# Patient Record
Sex: MALE | Race: WHITE | Employment: PART TIME | URBAN - METROPOLITAN AREA
[De-identification: names, ages, dates, MRNs, and addresses within clinical notes are randomized per-mention and may not be internally consistent; named-entity substitution may affect disease eponyms.]

---

## 2022-04-25 ENCOUNTER — HOSPITAL ENCOUNTER (OUTPATIENT)
Dept: GENERAL RADIOLOGY | Age: 72
Discharge: HOME | End: 2022-04-27
Payer: MEDICARE

## 2022-04-25 ENCOUNTER — HOSPITAL ENCOUNTER (OUTPATIENT)
Age: 72
Discharge: HOME | End: 2022-04-27
Payer: MEDICARE

## 2022-04-25 DIAGNOSIS — J45.20 MILD INTERMITTENT ASTHMA WITHOUT COMPLICATION: ICD-10-CM

## 2022-04-25 PROCEDURE — 71046 X-RAY EXAM CHEST 2 VIEWS: CPT

## 2024-06-03 ENCOUNTER — OFFICE VISIT (OUTPATIENT)
Dept: ORTHOPEDIC SURGERY | Age: 74
End: 2024-06-03
Payer: COMMERCIAL

## 2024-06-03 DIAGNOSIS — S42.294A OTHER CLOSED NONDISPLACED FRACTURE OF PROXIMAL END OF RIGHT HUMERUS, INITIAL ENCOUNTER: Primary | ICD-10-CM

## 2024-06-03 PROCEDURE — 23600 CLTX PROX HUMRL FX W/O MNPJ: CPT | Performed by: ORTHOPAEDIC SURGERY

## 2024-06-03 PROCEDURE — 99203 OFFICE O/P NEW LOW 30 MIN: CPT | Performed by: ORTHOPAEDIC SURGERY

## 2024-06-03 PROCEDURE — 1123F ACP DISCUSS/DSCN MKR DOCD: CPT | Performed by: ORTHOPAEDIC SURGERY

## 2024-06-03 RX ORDER — OXYCODONE HYDROCHLORIDE AND ACETAMINOPHEN 5; 325 MG/1; MG/1
TABLET ORAL
COMMUNITY
Start: 2024-05-31

## 2024-06-03 RX ORDER — ROSUVASTATIN CALCIUM 5 MG/1
TABLET, COATED ORAL
COMMUNITY

## 2024-06-03 RX ORDER — TAMSULOSIN HYDROCHLORIDE 0.4 MG/1
CAPSULE ORAL
COMMUNITY

## 2024-06-03 RX ORDER — OXYCODONE HYDROCHLORIDE AND ACETAMINOPHEN 5; 325 MG/1; MG/1
1 TABLET ORAL EVERY 6 HOURS PRN
Qty: 28 TABLET | Refills: 0 | Status: SHIPPED | OUTPATIENT
Start: 2024-06-03 | End: 2024-06-10

## 2024-06-03 NOTE — PROGRESS NOTES
Jossue Simmons is a 74 y.o. year old male who presents today for evaluation of a right shoulder injury which occurred on 5/31/24.  The patient reports that this injury occurred when he fell onto the shoulder at work.  The patient denies any other injuries.  Movement makes the pain worse, the splint and resting makes the pain better.      The patient's past medical history, medications, and review of systems was reviewed.       On Physical Exam, Jossue Simmons is well-developed, well-nourished, and oriented to person, place and time.  his gait is intact.  On evaluation of his right upper extremity, there is obvious deformity.  There is swelling and is ecchymosis.  he is tender to palpation over the proximal humerus, and otherwise nontender over the remainder of the extremity.  Range of motion is decreased secondary to pain over the right shoulder.  The skin overlying the right arm is intact without evidence of lesion, laceration or abrasion.  Distal pulses are 2+ and symmetric bilaterally.  Sensation is grossly intact to light touch and symmetric bilaterally.    Xrays:  Xrays dated 5/31/24 were reviewed.  Non-displaced greater tuberosity and     Impression:  right proximal humerus Fracture    Plan:   Sling for 6 weeks  Can come out of sling to move elbow, wrist, and hand  Follow up 1 week  Percocet 5mg

## 2024-06-05 DIAGNOSIS — S42.294A OTHER CLOSED NONDISPLACED FRACTURE OF PROXIMAL END OF RIGHT HUMERUS, INITIAL ENCOUNTER: Primary | ICD-10-CM

## 2024-06-11 ENCOUNTER — OFFICE VISIT (OUTPATIENT)
Dept: ORTHOPEDIC SURGERY | Age: 74
End: 2024-06-11

## 2024-06-11 DIAGNOSIS — S42.294A OTHER CLOSED NONDISPLACED FRACTURE OF PROXIMAL END OF RIGHT HUMERUS, INITIAL ENCOUNTER: Primary | ICD-10-CM

## 2024-06-11 PROCEDURE — 99024 POSTOP FOLLOW-UP VISIT: CPT | Performed by: ORTHOPAEDIC SURGERY

## 2024-06-11 NOTE — PROGRESS NOTES
Jossue Simmons returns today for follow-up of a right proximal humerus fracture.  Date of injury was 5/31.    Pain level is a 5/10. He is not taking anything for pain and has been in sling    Physical Exam:  Right shoulder - Skin intact with swelling          is  tender to palpation at the area of the fracture site.          ROM   limited          The skin overlying the  is intact without evidence of scar, lesion, laceration or               abrasion.          Pulses are intact and symmetric bilaterally         Strength 4/5         Sensation wnl    Xrays:   Well aligned proximal humerus fx  Radiographic findings reviewed with patient    Impression:   Encounter Diagnosis   Name Primary?    Other closed nondisplaced fracture of proximal end of right humerus, initial encounter Yes       Plan:   Continue sling  Follow up 4 weeks with xray

## 2024-07-10 ENCOUNTER — OFFICE VISIT (OUTPATIENT)
Dept: ORTHOPEDIC SURGERY | Age: 74
End: 2024-07-10

## 2024-07-10 VITALS — WEIGHT: 205 LBS | TEMPERATURE: 98 F | BODY MASS INDEX: 30.36 KG/M2 | HEIGHT: 69 IN

## 2024-07-10 DIAGNOSIS — S42.294A OTHER CLOSED NONDISPLACED FRACTURE OF PROXIMAL END OF RIGHT HUMERUS, INITIAL ENCOUNTER: Primary | ICD-10-CM

## 2024-07-10 NOTE — PROGRESS NOTES
Jossue Simmons returns today for follow-up of a right proximal humerus fracture.  Date of injury was 5/31. He has been wearing sling. He stated he is better than when I saw him last. He is not taking anything for pain.    Physical Exam:  Right shoulder - Skin intact with swelling          is  tender to palpation at the area of the fracture site.          ROM   limited          The skin overlying the  is intact without evidence of scar, lesion, laceration or               abrasion.          Pulses are intact and symmetric bilaterally         Strength 4/5         Sensation wnl    Xrays:   Well aligned healing proximal humerus fx  Radiographic findings reviewed with patient    Impression:   Encounter Diagnosis   Name Primary?    Other closed nondisplaced fracture of proximal end of right humerus, initial encounter Yes       Plan:  Can d/c sling  C9 for PT referral  Follow up 6 weeks  No heavy lifting  Can begin pendulums and wall walks

## 2024-08-14 ENCOUNTER — EVALUATION (OUTPATIENT)
Dept: PHYSICAL THERAPY | Age: 74
End: 2024-08-14

## 2024-08-14 DIAGNOSIS — S42.294A OTHER NONDISPLACED FRACTURE OF UPPER END OF RIGHT HUMERUS, INITIAL ENCOUNTER FOR CLOSED FRACTURE: Primary | ICD-10-CM

## 2024-08-14 NOTE — PROGRESS NOTES
Physical Therapy Daily Treatment Note    Date: 2024  Patient Name: Jossue Simmons  : 1950   MRN: [id number]  DOInjury: 2024  DOSx: -   Referring Provider: Moises Aquino DO     Medical Diagnosis:   S42.294A (ICD-10-CM) - Other nondisplaced fracture of upper end of right humerus, initial encounter for closed fracture    Lester is a  that fell at work sustaining R proximal humeral fx. He has good starting PT ROM. We will work on ROM and strength. I expect he will do well.     X = TO BE PERFORMED NEXT VISIT  > = PROGRESS TO THIS    S: See eval  O: Discussed anatomy, physiology, body mechanics, principles of loading, and progressive loading/activity.  Reviewed home exercise program extensively; written copy provided.   Access Code: [email or web address]  URL: [email or web address]  Date: 2024  Prepared by: Dilan Collazo    Exercises  - Supine Shoulder Flexion with Dowel  - 3 x daily - 7 x weekly - 2 sets - 10-15 reps  - Supine Shoulder External Rotation with Dowel  - 3 x daily - 7 x weekly - 2 sets - 10-15 reps  - Standing Bilateral Shoulder Internal Rotation AAROM with Dowel  - 3 x daily - 7 x weekly - 2 sets - 10-15 reps  Time 6774-1621     Visit 1 Repeat outcome measure at mid point and end.    Pain Pain with activity 10/10     ROM Right Shoulder:  AROM: 125° Forward elevation,  60° ER in scapular plane,  IR to R PSIS  PROM: 125° Forward elevation,  70° ER in scapular plane,  15° IR     Left Shoulder:  AROM: 155° Forward elevation,  90° ER in scapular plane,  IR to T8  PROM: 170° Forward elevation,  110° ER in scapular plane , 45° IR     Modalities         MO   Manual         MT         Stretch      Table slides   TE   Wall Flexion   TE   Wall ER stretch   TE   Towel IR stretch   TE   IR reaching behind back   TE   Exercise      Shoulder scaption isometrics      Shoulder ER isometrics       Shrugs AROM   TE   Pendulum Ex   TE   Pulleys - flex   TE   Pulleys-IR   TE   Supine wand chest

## 2024-08-14 NOTE — PROGRESS NOTES
[facility] OUTPATIENT REHABILITATION  PHYSICAL THERAPY INITIAL EVALUATION         Date:  2024   Patient: Jossue Simmons  : 1950  MRN: [id number]  Referring Provider:  Moises Aquino DO    Medical Diagnosis:     S42.294A (ICD-10-CM) - Other nondisplaced fracture of upper end of right humerus, initial encounter for closed fracture    Physician Order: Eval and Treat      SUBJECTIVE:     Onset date: 2024    Onset: Sudden     History / Mechanism of Injury: fell on R shoulder at work.  Patient is right handed.    Interventions for current problem:  sling 6 weeks, oxycodone x 5 days    Chief complaint: pain, decreased motion, inability / limited ability to use arm    Behavior: condition is getting better    Pain:   Current: 0/10     Best: 0/10     Worst:10/10 (occurs with reaching).  Pain returns to baseline in a few minutes  Pain frequency: intermittent    Symptom Type / Quality: aching, sharp  Location:: anterior, inferior to acromion, mid-deltoid region        Aggravated by: reaching overhead, reaching out    Relieved by: rest    Imaging results: No results found.    Past Medical History:  No past medical history on file.  No past surgical history on file.    Medications:   Current Outpatient Medications   Medication Sig Dispense Refill    oxyCODONE-acetaminophen (PERCOCET) 5-325 MG per tablet       apixaban (ELIQUIS) 5 MG TABS tablet Eliquis 5 mg tablet   Take 1 tablet twice a day by oral route.      rosuvastatin (CRESTOR) 5 MG tablet rosuvastatin 5 mg tablet   Take 1 tablet every day by oral route.      tamsulosin (FLOMAX) 0.4 MG capsule tamsulosin 0.4 mg capsule   Take 2 capsules every day by oral route.       No current facility-administered medications for this visit.       Occupation:   . Physical demands include: walking, sitting.  Status:  30-35hrs/week .    Exercise regimen: none    Hobbies: working around the house    Patient Goals: pain relief, full use

## 2024-08-16 ENCOUNTER — TREATMENT (OUTPATIENT)
Dept: PHYSICAL THERAPY | Age: 74
End: 2024-08-16

## 2024-08-16 DIAGNOSIS — S42.294A OTHER NONDISPLACED FRACTURE OF UPPER END OF RIGHT HUMERUS, INITIAL ENCOUNTER FOR CLOSED FRACTURE: Primary | ICD-10-CM

## 2024-08-16 NOTE — PROGRESS NOTES
Pulleys-IR   TE   Supine wand chest press 2 x 10  TE   Supine wand flex 2 x 10  TE   Supine wand ER/IR 2 x 10  TE   Standing wand behind back IR / swinging motion 2 x 10  TE   Supine flexion   TE   S-lying ER   TE   Standing wand flex   TE   Standing flexion   TE   ROWS: H   TA   ROWS: M   TA   ROWS: L   TA   ER (towel roll under arm)   TE   IR (towel roll under arm)   TE               A:  Tolerated well.  Pt able to tolerate all ex's with limited range of motion .  P: Continue with rehab plan  Ayush Camacho PTA    Treatment Charges: Mins Units   Initial Evaluation     Re-Evaluation     Ther Exercise         TE 30 2   Manual Therapy     MT     Ther Activities        TA     Gait Training          GT     Neuro Re-education NR     Modalities     Non-Billable Service Time     Other     Total Time/Units 30 2

## 2024-08-19 ENCOUNTER — TREATMENT (OUTPATIENT)
Dept: PHYSICAL THERAPY | Age: 74
End: 2024-08-19
Payer: COMMERCIAL

## 2024-08-19 DIAGNOSIS — S42.294A OTHER CLOSED NONDISPLACED FRACTURE OF PROXIMAL END OF RIGHT HUMERUS, INITIAL ENCOUNTER: Primary | ICD-10-CM

## 2024-08-19 DIAGNOSIS — S42.294A OTHER NONDISPLACED FRACTURE OF UPPER END OF RIGHT HUMERUS, INITIAL ENCOUNTER FOR CLOSED FRACTURE: Primary | ICD-10-CM

## 2024-08-19 PROCEDURE — 97110 THERAPEUTIC EXERCISES: CPT

## 2024-08-19 NOTE — PROGRESS NOTES
Physical Therapy Daily Treatment Note    Date: 2024  Patient Name: Jossue Simmons  : 1950   MRN: [id number]  DOInjury: 2024  DOSx: -   Referring Provider: Moises Aquino DO     Medical Diagnosis:   S42.294A (ICD-10-CM) - Other nondisplaced fracture of upper end of right humerus, initial encounter for closed fracture    Lester is a  that fell at work sustaining R proximal humeral fx. He has good starting PT ROM. We will work on ROM and strength. I expect he will do well.     X = TO BE PERFORMED NEXT VISIT  > = PROGRESS TO THIS  Access Code: [email or web address]  URL: [email or web address]  Date: 2024  Prepared by: Dilan Collazo    Exercises  - Supine Shoulder Flexion with Dowel  - 3 x daily - 7 x weekly - 2 sets - 10-15 reps  - Supine Shoulder External Rotation with Dowel  - 3 x daily - 7 x weekly - 2 sets - 10-15 reps  - Standing Bilateral Shoulder Internal Rotation AAROM with Dowel  - 3 x daily - 7 x weekly - 2 sets - 10-15 reps    S: Pt reports tightness and pain with movement.   O: Discussed anatomy, physiology, body mechanics, principles of loading, and progressive loading/activity.  Reviewed home exercise program extensively; written copy provided.   Time 8306-4398     Visit 3/18  Claim # [id number]  Dx: S42.294A  DOI: 2024   approved approved  through 2024  Repeat outcome measure at mid point and end.    Pain Pain with activity 10/10     ROM Right Shoulder:  AROM: 125° Forward elevation,  60° ER in scapular plane,  IR to R PSIS  PROM: 125° Forward elevation,  70° ER in scapular plane,  15° IR     Left Shoulder:  AROM: 155° Forward elevation,  90° ER in scapular plane,  IR to T8  PROM: 170° Forward elevation,  110° ER in scapular plane , 45° IR     Modalities         MO   Manual         MT         Stretch      Table slides   TE   Wall Flexion   TE   Wall ER stretch   TE   Towel IR stretch   TE   IR reaching behind back   TE   Exercise      Shoulder scaption

## 2024-08-21 ENCOUNTER — TREATMENT (OUTPATIENT)
Dept: PHYSICAL THERAPY | Age: 74
End: 2024-08-21
Payer: COMMERCIAL

## 2024-08-21 DIAGNOSIS — S42.294A OTHER NONDISPLACED FRACTURE OF UPPER END OF RIGHT HUMERUS, INITIAL ENCOUNTER FOR CLOSED FRACTURE: Primary | ICD-10-CM

## 2024-08-21 PROCEDURE — 97110 THERAPEUTIC EXERCISES: CPT

## 2024-08-21 NOTE — PROGRESS NOTES
Physical Therapy Daily Treatment Note    Date: 2024  Patient Name: Jossue Simmons  : 1950   MRN: [id number]  DOInjury: 2024  DOSx: -   Referring Provider: oMises Aquino DO     Medical Diagnosis:   S42.294A (ICD-10-CM) - Other nondisplaced fracture of upper end of right humerus, initial encounter for closed fracture    Lester is a  that fell at work sustaining R proximal humeral fx. He has good starting PT ROM. We will work on ROM and strength. I expect he will do well.     X = TO BE PERFORMED NEXT VISIT  > = PROGRESS TO THIS  Access Code: [email or web address]  URL: [email or web address]  Date: 2024  Prepared by: Dilan Collazo    Exercises  - Supine Shoulder Flexion with Dowel  - 3 x daily - 7 x weekly - 2 sets - 10-15 reps  - Supine Shoulder External Rotation with Dowel  - 3 x daily - 7 x weekly - 2 sets - 10-15 reps  - Standing Bilateral Shoulder Internal Rotation AAROM with Dowel  - 3 x daily - 7 x weekly - 2 sets - 10-15 reps    S: Pt reports tightness and pain with movement.   O: Discussed anatomy, physiology, body mechanics, principles of loading, and progressive loading/activity.  Reviewed home exercise program extensively; written copy provided.   Time 4410-9234     Visit   Claim # [id number]  Dx: S42.294A  DOI: 2024   approved approved  through 2024  Repeat outcome measure at mid point and end.    Pain Pain with activity 10/10     ROM Right Shoulder:  AROM: 125° Forward elevation,  60° ER in scapular plane,  IR to R PSIS  PROM: 125° Forward elevation,  70° ER in scapular plane,  15° IR     Left Shoulder:  AROM: 155° Forward elevation,  90° ER in scapular plane,  IR to T8  PROM: 170° Forward elevation,  110° ER in scapular plane , 45° IR     Modalities         MO   Manual         MT         Stretch      Table slides   TE   Wall Flexion   TE   Wall ER stretch   TE   Towel IR stretch   TE   IR reaching behind back   TE   Exercise      Shoulder scaption

## 2024-08-22 ENCOUNTER — OFFICE VISIT (OUTPATIENT)
Dept: ORTHOPEDIC SURGERY | Age: 74
End: 2024-08-22

## 2024-08-22 VITALS — WEIGHT: 205 LBS | TEMPERATURE: 98 F | BODY MASS INDEX: 30.36 KG/M2 | HEIGHT: 69 IN

## 2024-08-22 DIAGNOSIS — S42.294A OTHER CLOSED NONDISPLACED FRACTURE OF PROXIMAL END OF RIGHT HUMERUS, INITIAL ENCOUNTER: Primary | ICD-10-CM

## 2024-08-22 PROCEDURE — 99024 POSTOP FOLLOW-UP VISIT: CPT | Performed by: ORTHOPAEDIC SURGERY

## 2024-08-22 NOTE — PROGRESS NOTES
Jossue Simmons returns today for follow-up of a right proximal humerus fracture.  Date of injury was 5/31.  He stated he is better than when I saw him last. He is currently in PT. He c/o of pain during certain movements. He is using ice for pain relief. He does get stiffness in the morning.    Physical Exam:  Right shoulder - Skin intact with swelling          is  tender to palpation at the area of the fracture site.          /30/BL           The skin overlying the  is intact without evidence of scar, lesion, laceration or               abrasion.          Pulses are intact and symmetric bilaterally         Strength 4/5         Sensation wnl    Xrays:   Well aligned healing proximal humerus fx  Radiographic findings reviewed with patient    Impression:   Encounter Diagnosis   Name Primary?    Other closed nondisplaced fracture of proximal end of right humerus, initial encounter Yes       Plan:     The patient is weak in supraspinatus 4-/5.  I am concerned he may have torn his rotator cuff as a result of the fall.  I will put in a C9 for an MRI and see him back with the results.  He will remain off work and lacks the ROM and strength to return to work at this time.  Follow up after the MRI is performed.

## 2024-08-23 ENCOUNTER — TREATMENT (OUTPATIENT)
Dept: PHYSICAL THERAPY | Age: 74
End: 2024-08-23

## 2024-08-23 DIAGNOSIS — S42.294A OTHER NONDISPLACED FRACTURE OF UPPER END OF RIGHT HUMERUS, INITIAL ENCOUNTER FOR CLOSED FRACTURE: Primary | ICD-10-CM

## 2024-08-23 NOTE — PROGRESS NOTES
slides   TE   Wall Flexion   TE   Wall ER stretch   TE   Towel IR stretch   TE   IR reaching behind back   TE   Exercise      Shoulder scaption isometrics      Shoulder ER isometrics       Shrugs AROM   TE   Pendulum Ex   TE   Pulleys - flex  X 3 min   TE   Pulleys-IR X 2 min Painful following; d/c next session? TE   Supine wand chest press 2 x 15  TE   Supine wand flex 2 x 15  TE   Supine wand ER/IR 2 x 15  TE   Standing wand behind back IR / swinging motion 2 x 15  TE   Supine flexion 3 x 10  TE   S-lying ER   TE   Standing wand flex   TE   Standing flexion   TE   ROWS: H   TA   ROWS: M Green 2 x 15  TA   ROWS: L   TA   ER (towel roll under arm)   TE   IR (towel roll under arm)   TE               A:  Tolerated well.  Pt able to tolerate all ex's with limited range of motion . Added mid row to HEP; given green tubing for home  P: Continue with rehab plan  Hollie Vines PTA    Treatment Charges: Mins Units   Initial Evaluation     Re-Evaluation     Ther Exercise         TE 30 2   Manual Therapy     MT     Ther Activities        TA     Gait Training          GT     Neuro Re-education NR     Modalities     Non-Billable Service Time     Other     Total Time/Units 30 2

## 2024-08-26 ENCOUNTER — TREATMENT (OUTPATIENT)
Dept: PHYSICAL THERAPY | Age: 74
End: 2024-08-26
Payer: COMMERCIAL

## 2024-08-26 DIAGNOSIS — S42.294A OTHER NONDISPLACED FRACTURE OF UPPER END OF RIGHT HUMERUS, INITIAL ENCOUNTER FOR CLOSED FRACTURE: Primary | ICD-10-CM

## 2024-08-26 PROCEDURE — 97110 THERAPEUTIC EXERCISES: CPT

## 2024-08-26 NOTE — PROGRESS NOTES
Physical Therapy Daily Treatment Note    Date: 2024  Patient Name: Jossue Simmons  : 1950   MRN: [id number]  DOInjury: 2024  DOSx: -   Referring Provider: Moises Aquino DO     Medical Diagnosis:   S42.294A (ICD-10-CM) - Other nondisplaced fracture of upper end of right humerus, initial encounter for closed fracture    Lester is a  that fell at work sustaining R proximal humeral fx. He has good starting PT ROM. We will work on ROM and strength. I expect he will do well.     X = TO BE PERFORMED NEXT VISIT  > = PROGRESS TO THIS  Access Code: [email or web address]  URL: [email or web address]  Date: 2024  Prepared by: Dilan Collazo    Exercises  - Supine Shoulder Flexion with Dowel  - 3 x daily - 7 x weekly - 2 sets - 10-15 reps  - Supine Shoulder External Rotation with Dowel  - 3 x daily - 7 x weekly - 2 sets - 10-15 reps  - Standing Bilateral Shoulder Internal Rotation AAROM with Dowel  - 3 x daily - 7 x weekly - 2 sets - 10-15 reps    S: Pt reports 3/10 pain at rest that elevates to 8/10 during activity. Reports pain does not last long and despite pain shoulder does feel more mobile following activity  O: Discussed anatomy, physiology, body mechanics, principles of loading, and progressive loading/activity.  Reviewed home exercise program extensively; written copy provided.   Time 3979-6710     Visit   Claim # [id number]  Dx: S42.294A  DOI: 2024   approved approved  through 2024  Repeat outcome measure at mid point and end.    Pain Pain at rest 3/10  Pain with activity 8/10     ROM (24)  Right Shoulder:  AROM: 128° Forward elevation,  60° ER in scapular plane,  IR to R PSIS  PROM: 132° Forward elevation,  70° ER in scapular plane,  15° IR     Left Shoulder:  AROM: 155° Forward elevation,  90° ER in scapular plane,  IR to T8  PROM: 170° Forward elevation,  110° ER in scapular plane , 45° IR     Modalities         MO   Manual         MT         Stretch      Table

## 2024-08-28 ENCOUNTER — TRANSCRIBE ORDERS (OUTPATIENT)
Dept: ADMINISTRATIVE | Age: 74
End: 2024-08-28

## 2024-08-28 ENCOUNTER — TREATMENT (OUTPATIENT)
Dept: PHYSICAL THERAPY | Age: 74
End: 2024-08-28

## 2024-08-28 DIAGNOSIS — S42.294A OTHER NONDISPLACED FRACTURE OF UPPER END OF RIGHT HUMERUS, INITIAL ENCOUNTER FOR CLOSED FRACTURE: Primary | ICD-10-CM

## 2024-08-28 DIAGNOSIS — S42.294A OTH NONDISP FX OF UPPER END OF RIGHT HUMERUS, INIT: Primary | ICD-10-CM

## 2024-08-30 ENCOUNTER — TREATMENT (OUTPATIENT)
Dept: PHYSICAL THERAPY | Age: 74
End: 2024-08-30

## 2024-08-30 DIAGNOSIS — S42.294A OTHER NONDISPLACED FRACTURE OF UPPER END OF RIGHT HUMERUS, INITIAL ENCOUNTER FOR CLOSED FRACTURE: Primary | ICD-10-CM

## 2024-08-30 NOTE — PROGRESS NOTES
Physical Therapy Daily Treatment Note    Date: 2024  Patient Name: Jossue Simmons  : 1950   MRN: [id number]  DOInjury: 2024  DOSx: -   Referring Provider: Moises Aquino DO     Medical Diagnosis:   S42.294A (ICD-10-CM) - Other nondisplaced fracture of upper end of right humerus, initial encounter for closed fracture    Lester is a  that fell at work sustaining R proximal humeral fx. He has good starting PT ROM. We will work on ROM and strength. I expect he will do well.     X = TO BE PERFORMED NEXT VISIT  > = PROGRESS TO THIS  Access Code: [email or web address]  URL: [email or web address]  Date: 2024  Prepared by: Dilan Collazo    Exercises  - Supine Shoulder Flexion with Dowel  - 3 x daily - 7 x weekly - 2 sets - 10-15 reps  - Supine Shoulder External Rotation with Dowel  - 3 x daily - 7 x weekly - 2 sets - 10-15 reps  - Standing Bilateral Shoulder Internal Rotation AAROM with Dowel  - 3 x daily - 7 x weekly - 2 sets - 10-15 reps    S: Pt reports not getting any better .  O: Discussed anatomy, physiology, body mechanics, principles of loading, and progressive loading/activity.  Reviewed home exercise program extensively; written copy provided.   Time 7198-6366     Visit 8 visits   Claim # [id number]  Dx: S42.294A  DOI: 2024  Wc approved approved 18 through 2024  Repeat outcome measure at mid point and end.    Pain Pain at rest 3/10  Pain with activity 8/10     ROM (24)  Right Shoulder:  AROM: 128° Forward elevation,  60° ER in scapular plane,  IR to R PSIS  PROM: 132° Forward elevation,  70° ER in scapular plane,  15° IR     Left Shoulder:  AROM: 155° Forward elevation,  90° ER in scapular plane,  IR to T8  PROM: 170° Forward elevation,  110° ER in scapular plane , 45° IR     Modalities         MO   Manual         MT         Stretch      Table slides   TE   Wall Flexion   TE   Wall ER stretch   TE   Towel IR stretch   TE   IR reaching behind back   TE   Exercise       Shoulder scaption isometrics      Shoulder ER isometrics       Shrugs AROM   TE   Pendulum Ex   TE   Pulleys - flex  X 3 min   TE   Pulleys-IR Painful following; d/c next session? TE   Supine wand chest press 2 x 15  TE   Supine wand flex 2 x 15  TE   Supine wand ER/IR 2 x 15  TE   Standing wand behind back IR / swinging motion 2 x 15  TE   Supine flexion  TE   S-lying ER  TE   Standing wand flex   TE   Standing flexion   TE   ROWS: H   TA   ROWS: M Green 2 x 15  TE   ROWS: L Green 2 x 15   TE   ER (towel roll under arm) Red  2 x 15  TE   IR (towel roll under arm) Green 2 x 15  TE               A:  Tolerated well but continues to  c/ lack of motion ,  weakness along with soreness/pain . (  Pt wants to RTW but the issue is he might have a RTC tear.   Wants a MRI done first.  Patient is going to call and get scheduled for a MRI asap.   Added ER/IR slight painful, with limited ROM Pt able to tolerate all ex's with limited range of motion . Added mid row to HEP; given green tubing for home  ).  P: Continue with rehab plan  . Pt awaiting to have a MRI of shoulder .  Ayush Camacho, PTA    Treatment Charges: Mins Units   Initial Evaluation     Re-Evaluation     Ther Exercise         TE 30 2   Manual Therapy     MT     Ther Activities        TA     Gait Training          GT     Neuro Re-education NR     Modalities     Non-Billable Service Time     Other     Total Time/Units 30 2

## 2024-09-05 ENCOUNTER — TREATMENT (OUTPATIENT)
Dept: PHYSICAL THERAPY | Age: 74
End: 2024-09-05
Payer: COMMERCIAL

## 2024-09-05 DIAGNOSIS — S42.294A OTHER NONDISPLACED FRACTURE OF UPPER END OF RIGHT HUMERUS, INITIAL ENCOUNTER FOR CLOSED FRACTURE: Primary | ICD-10-CM

## 2024-09-05 PROCEDURE — 97110 THERAPEUTIC EXERCISES: CPT

## 2024-09-05 NOTE — PROGRESS NOTES
Physical Therapy Daily Treatment Note    Date: 2024  Patient Name: Jossue Simmons  : 1950   MRN: [id number]  DOInjury: 2024  DOSx: -   Referring Provider: Moises Aquino DO     Medical Diagnosis:   S42.294A (ICD-10-CM) - Other nondisplaced fracture of upper end of right humerus, initial encounter for closed fracture    Lester is a  that fell at work sustaining R proximal humeral fx. He has good starting PT ROM. We will work on ROM and strength. I expect he will do well.     X = TO BE PERFORMED NEXT VISIT  > = PROGRESS TO THIS  Access Code: [email or web address]  URL: [email or web address]  Date: 2024  Prepared by: Dilan Collazo    Exercises  - Supine Shoulder Flexion with Dowel  - 3 x daily - 7 x weekly - 2 sets - 10-15 reps  - Supine Shoulder External Rotation with Dowel  - 3 x daily - 7 x weekly - 2 sets - 10-15 reps  - Standing Bilateral Shoulder Internal Rotation AAROM with Dowel  - 3 x daily - 7 x weekly - 2 sets - 10-15 reps    S: Pt reports not getting any better, getting a MRI  O: Discussed anatomy, physiology, body mechanics, principles of loading, and progressive loading/activity.  Reviewed home exercise program extensively; written copy provided.   Time 8953-4540     Visit 9 visits   Claim # [id number]  Dx: S42.294A  DOI: 2024   approved approved 18 through 2024  Repeat outcome measure at mid point and end.    Pain Pain at rest 3/10  Pain with activity 8/10     ROM (24)  Right Shoulder:  AROM: 128° Forward elevation,  60° ER in scapular plane,  IR to R PSIS  PROM: 132° Forward elevation,  70° ER in scapular plane,  15° IR     Left Shoulder:  AROM: 155° Forward elevation,  90° ER in scapular plane,  IR to T8  PROM: 170° Forward elevation,  110° ER in scapular plane , 45° IR     Modalities         MO   Manual         MT         Stretch      Table slides   TE   Wall Flexion   TE   Wall ER stretch   TE   Towel IR stretch   TE   IR reaching behind back   TE

## 2024-09-09 ENCOUNTER — TREATMENT (OUTPATIENT)
Dept: PHYSICAL THERAPY | Age: 74
End: 2024-09-09
Payer: COMMERCIAL

## 2024-09-09 DIAGNOSIS — S42.294A OTHER NONDISPLACED FRACTURE OF UPPER END OF RIGHT HUMERUS, INITIAL ENCOUNTER FOR CLOSED FRACTURE: Primary | ICD-10-CM

## 2024-09-09 PROCEDURE — 97110 THERAPEUTIC EXERCISES: CPT

## 2024-09-10 ENCOUNTER — HOSPITAL ENCOUNTER (OUTPATIENT)
Dept: MRI IMAGING | Age: 74
Discharge: HOME | End: 2024-09-12
Payer: COMMERCIAL

## 2024-09-10 DIAGNOSIS — S42.294A OTH NONDISP FX OF UPPER END OF RIGHT HUMERUS, INIT: ICD-10-CM

## 2024-09-10 PROCEDURE — 73221 MRI JOINT UPR EXTREM W/O DYE: CPT

## 2024-09-12 ENCOUNTER — TREATMENT (OUTPATIENT)
Dept: PHYSICAL THERAPY | Age: 74
End: 2024-09-12
Payer: COMMERCIAL

## 2024-09-12 DIAGNOSIS — S42.294A OTHER NONDISPLACED FRACTURE OF UPPER END OF RIGHT HUMERUS, INITIAL ENCOUNTER FOR CLOSED FRACTURE: Primary | ICD-10-CM

## 2024-09-12 PROCEDURE — 97110 THERAPEUTIC EXERCISES: CPT

## 2024-09-17 ENCOUNTER — OFFICE VISIT (OUTPATIENT)
Dept: ORTHOPEDIC SURGERY | Age: 74
End: 2024-09-17
Payer: COMMERCIAL

## 2024-09-17 ENCOUNTER — TREATMENT (OUTPATIENT)
Dept: PHYSICAL THERAPY | Age: 74
End: 2024-09-17
Payer: COMMERCIAL

## 2024-09-17 VITALS — HEIGHT: 69 IN | BODY MASS INDEX: 30.36 KG/M2 | TEMPERATURE: 98.6 F | WEIGHT: 205 LBS

## 2024-09-17 DIAGNOSIS — S42.294A OTHER NONDISPLACED FRACTURE OF UPPER END OF RIGHT HUMERUS, INITIAL ENCOUNTER FOR CLOSED FRACTURE: Primary | ICD-10-CM

## 2024-09-17 DIAGNOSIS — S42.294A OTHER CLOSED NONDISPLACED FRACTURE OF PROXIMAL END OF RIGHT HUMERUS, INITIAL ENCOUNTER: Primary | ICD-10-CM

## 2024-09-17 PROCEDURE — 1123F ACP DISCUSS/DSCN MKR DOCD: CPT | Performed by: ORTHOPAEDIC SURGERY

## 2024-09-17 PROCEDURE — 99213 OFFICE O/P EST LOW 20 MIN: CPT | Performed by: ORTHOPAEDIC SURGERY

## 2024-09-17 PROCEDURE — 97110 THERAPEUTIC EXERCISES: CPT

## 2024-09-19 ENCOUNTER — TREATMENT (OUTPATIENT)
Dept: PHYSICAL THERAPY | Age: 74
End: 2024-09-19

## 2024-09-19 DIAGNOSIS — S42.294A OTHER NONDISPLACED FRACTURE OF UPPER END OF RIGHT HUMERUS, INITIAL ENCOUNTER FOR CLOSED FRACTURE: Primary | ICD-10-CM

## 2024-09-30 ENCOUNTER — TREATMENT (OUTPATIENT)
Dept: PHYSICAL THERAPY | Age: 74
End: 2024-09-30
Payer: COMMERCIAL

## 2024-09-30 DIAGNOSIS — S42.294A OTHER NONDISPLACED FRACTURE OF UPPER END OF RIGHT HUMERUS, INITIAL ENCOUNTER FOR CLOSED FRACTURE: Primary | ICD-10-CM

## 2024-09-30 PROCEDURE — 97110 THERAPEUTIC EXERCISES: CPT

## 2024-09-30 NOTE — PROGRESS NOTES
TE   Wall ER stretch   TE   Towel IR stretch   TE   IR reaching behind back   TE   Exercise      Shoulder scaption isometrics      Shoulder ER isometrics       Shrugs AROM   TE   Pendulum Ex   TE   Pulleys - flex  X 3 min   TE   Pulleys-IR X 2 min     TE   Supine wand chest press  TE   Supine wand flex  TE   Supine wand ER/IR  TE   Standing wand behind back IR / swinging motion  TE   Supine flexion  TE   S-lying ER  TE   Standing wand flex   TE   Standing flexion 2# 3 x 10  TE   ROWS: H Blue 2 x 15  TA   ROWS: M Blue  2 x 15  TE   ROWS: L Blue  2 x 15   TE   ER (towel roll under arm) green  2 x 15 Sore this motion TE   IR (towel roll under arm) Green 2 x 15  TE               A:  Tolerated well but continues to  c/ lack of motion ,  weakness along with soreness/pain . (  Pt wants to RTW but the issue is he might have a RTC tear.    Added ER/IR slight painful, with limited ROM Pt able to tolerate all ex's with limited range of motion . Added mid row to HEP; given green tubing for home  ).  P: Continue with rehab plan.   Becky Varghese PTA    Treatment Charges: Mins Units   Initial Evaluation     Re-Evaluation     Ther Exercise         TE 30 2   Manual Therapy     MT     Ther Activities        TA     Gait Training          GT     Neuro Re-education NR     Modalities     Non-Billable Service Time 10 0   Other     Total Time/Units 40 2

## 2024-10-07 ENCOUNTER — TREATMENT (OUTPATIENT)
Dept: PHYSICAL THERAPY | Age: 74
End: 2024-10-07
Payer: COMMERCIAL

## 2024-10-07 DIAGNOSIS — S42.294A OTHER NONDISPLACED FRACTURE OF UPPER END OF RIGHT HUMERUS, INITIAL ENCOUNTER FOR CLOSED FRACTURE: Primary | ICD-10-CM

## 2024-10-07 PROCEDURE — 97110 THERAPEUTIC EXERCISES: CPT

## 2024-10-07 NOTE — PROGRESS NOTES
Physical Therapy Daily Treatment Note    Date: 10/7/2024  Patient Name: Jossue Simmons  : 1950   MRN: [id number]  DOInjury: 2024  DOSx: -   Referring Provider: Moises Aquino DO     Medical Diagnosis:   S42.294A (ICD-10-CM) - Other nondisplaced fracture of upper end of right humerus, initial encounter for closed fracture    Lester is a  that fell at work sustaining R proximal humeral fx. He has good starting PT ROM. We will work on ROM and strength. I expect he will do well.     X = TO BE PERFORMED NEXT VISIT  > = PROGRESS TO THIS  Access Code: [email or web address]  URL: [email or web address]  Date: 2024  Prepared by: Dilan Collazo    Exercises  - Supine Shoulder Flexion with Dowel  - 3 x daily - 7 x weekly - 2 sets - 10-15 reps  - Supine Shoulder External Rotation with Dowel  - 3 x daily - 7 x weekly - 2 sets - 10-15 reps  - Standing Bilateral Shoulder Internal Rotation AAROM with Dowel  - 3 x daily - 7 x weekly - 2 sets - 10-15 reps    S: Pt reports working a lot of hours thur and Friday and was really sore sat/sun.  Did nothing on .   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    O: Discussed anatomy, physiology, body mechanics, principles of loading, and progressive loading/activity.  Reviewed home exercise program extensively; written copy provided.   Time 2469-2404     Visit  visits   Claim # [id number]  Dx: S42.294A  DOI: 2024   approved approved  through 2024  Repeat outcome measure at mid point and end.    Pain Pain at rest 3/10  Pain with activity 8/10     ROM (24)  Right Shoulder:  AROM: 128° Forward elevation,  60° ER in scapular plane,  IR to R PSIS  PROM: 132° Forward elevation,  70° ER in scapular plane,  15° IR     Left Shoulder:  AROM: 155° Forward elevation,  90° ER in scapular plane,  IR to T8  PROM: 170° Forward elevation,  110° ER in scapular plane , 45° IR     Modalities         MO   Manual

## 2024-10-09 ENCOUNTER — TREATMENT (OUTPATIENT)
Dept: PHYSICAL THERAPY | Age: 74
End: 2024-10-09
Payer: COMMERCIAL

## 2024-10-09 DIAGNOSIS — S42.294A OTHER NONDISPLACED FRACTURE OF UPPER END OF RIGHT HUMERUS, INITIAL ENCOUNTER FOR CLOSED FRACTURE: Primary | ICD-10-CM

## 2024-10-09 PROCEDURE — 97110 THERAPEUTIC EXERCISES: CPT

## 2024-10-09 NOTE — PROGRESS NOTES
Physical Therapy Daily Treatment Note    Date: 10/9/2024  Patient Name: Jossue Simmons  : 1950   MRN: [id number]  DOInjury: 2024  DOSx: -   Referring Provider: Moises Aquino DO     Medical Diagnosis:   S42.294A (ICD-10-CM) - Other nondisplaced fracture of upper end of right humerus, initial encounter for closed fracture    Lester is a  that fell at work sustaining R proximal humeral fx. He has good starting PT ROM. We will work on ROM and strength. I expect he will do well.     X = TO BE PERFORMED NEXT VISIT  > = PROGRESS TO THIS  Access Code: [email or web address]  URL: [email or web address]  Date: 2024  Prepared by: Dilan Collazo    Exercises  - Supine Shoulder Flexion with Dowel  - 3 x daily - 7 x weekly - 2 sets - 10-15 reps  - Supine Shoulder External Rotation with Dowel  - 3 x daily - 7 x weekly - 2 sets - 10-15 reps  - Standing Bilateral Shoulder Internal Rotation AAROM with Dowel  - 3 x daily - 7 x weekly - 2 sets - 10-15 reps    S: Pt reports overall doing ok.    O: Discussed anatomy, physiology, body mechanics, principles of loading, and progressive loading/activity.  Reviewed home exercise program extensively; written copy provided.   Time 8770-0663     Visit  visits   Claim # [id number]  Dx: S42.294A  DOI: 2024  Wc approved approved  through 2024  Repeat outcome measure at mid point and end.    Pain Pain at rest 3/10  Pain with activity 8/10     ROM (24)  Right Shoulder:  AROM: 128° Forward elevation,  60° ER in scapular plane,  IR to R PSIS  PROM: 132° Forward elevation,  70° ER in scapular plane,  15° IR     Left Shoulder:  AROM: 155° Forward elevation,  90° ER in scapular plane,  IR to T8  PROM: 170° Forward elevation,  110° ER in scapular plane , 45° IR     Modalities         MO   Manual         MT         Stretch      Table slides   TE   Wall Flexion   TE   Wall ER stretch   TE   Towel IR stretch   TE   IR reaching behind back   TE   Exercise

## 2024-10-21 ENCOUNTER — TELEPHONE (OUTPATIENT)
Dept: ORTHOPEDIC SURGERY | Age: 74
End: 2024-10-21

## 2024-10-21 NOTE — TELEPHONE ENCOUNTER
Patient came to the office and dropped off a form from his  that needs to be filled out and faxed back to his   [phone] Patient states he has a hearing this Wednesday on 23rd October    I faxed this form to Eloise

## 2024-10-26 VITALS
OXYGEN SATURATION: 97 % | HEART RATE: 66 BPM | TEMPERATURE: 97.7 F | SYSTOLIC BLOOD PRESSURE: 122 MMHG | RESPIRATION RATE: 18 BRPM | DIASTOLIC BLOOD PRESSURE: 74 MMHG

## 2024-10-26 PROCEDURE — 99283 EMERGENCY DEPT VISIT LOW MDM: CPT

## 2024-10-26 ASSESSMENT — LIFESTYLE VARIABLES
HOW OFTEN DO YOU HAVE A DRINK CONTAINING ALCOHOL: NEVER
HOW MANY STANDARD DRINKS CONTAINING ALCOHOL DO YOU HAVE ON A TYPICAL DAY: PATIENT DOES NOT DRINK

## 2024-10-27 ENCOUNTER — APPOINTMENT (OUTPATIENT)
Dept: GENERAL RADIOLOGY | Age: 74
End: 2024-10-27
Payer: MEDICARE

## 2024-10-27 ENCOUNTER — HOSPITAL ENCOUNTER (EMERGENCY)
Age: 74
Discharge: HOME | End: 2024-10-27
Attending: EMERGENCY MEDICINE
Payer: MEDICARE

## 2024-10-27 DIAGNOSIS — M70.21 OLECRANON BURSITIS OF RIGHT ELBOW: Primary | ICD-10-CM

## 2024-10-27 PROCEDURE — 73070 X-RAY EXAM OF ELBOW: CPT

## 2024-10-27 NOTE — ED PROVIDER NOTES
Patient is a 75 y/o male who presents to the ED with pain and swelling of his right elbow. Patient states that he noticed a \"bump\" on his right elbow last night. He denies any known injury but does state that he previously fell and fractured his right proximal humerus approximately five months ago. He denies working on his elbows. He denies any fever.          Review of Systems   Constitutional:  Negative for chills and fever.   HENT:  Negative for ear pain, sinus pressure and sore throat.    Eyes:  Negative for pain, discharge and redness.   Respiratory:  Negative for cough, shortness of breath and wheezing.    Cardiovascular:  Negative for chest pain.   Gastrointestinal:  Negative for abdominal pain, diarrhea, nausea and vomiting.   Genitourinary:  Negative for dysuria and frequency.   Musculoskeletal:  Positive for arthralgias and joint swelling. Negative for back pain.   Skin:  Negative for rash and wound.   Neurological:  Negative for weakness and headaches.   Hematological:  Negative for adenopathy.   All other systems reviewed and are negative.       Physical Exam  Vitals and nursing note reviewed.   Constitutional:       General: He is not in acute distress.  HENT:      Head: Normocephalic and atraumatic.      Right Ear: External ear normal.      Left Ear: External ear normal.      Nose: Nose normal.      Mouth/Throat:      Mouth: Mucous membranes are moist.   Eyes:      Conjunctiva/sclera: Conjunctivae normal.      Pupils: Pupils are equal, round, and reactive to light.   Cardiovascular:      Rate and Rhythm: Normal rate and regular rhythm.      Heart sounds: No murmur heard.  Pulmonary:      Effort: Pulmonary effort is normal. No respiratory distress.      Breath sounds: Normal breath sounds. No stridor. No wheezing, rhonchi or rales.   Abdominal:      General: Bowel sounds are normal. There is no distension.      Palpations: Abdomen is soft.      Tenderness: There is no abdominal tenderness. There is no

## 2024-10-28 ENCOUNTER — TELEPHONE (OUTPATIENT)
Dept: ADMINISTRATIVE | Age: 74
End: 2024-10-28

## 2024-10-28 NOTE — TELEPHONE ENCOUNTER
Pt went to ER 10/26 Rt Elbow bursitis. Would like to f/u with Dr Aquino. (Went thinking it was a blood clot )    [phone]

## 2024-11-04 ENCOUNTER — OFFICE VISIT (OUTPATIENT)
Dept: ORTHOPEDIC SURGERY | Age: 74
End: 2024-11-04

## 2024-11-04 VITALS — HEIGHT: 69 IN | BODY MASS INDEX: 30.36 KG/M2 | WEIGHT: 205 LBS | TEMPERATURE: 98 F

## 2024-11-04 DIAGNOSIS — M70.21 OLECRANON BURSITIS OF RIGHT ELBOW: Primary | ICD-10-CM

## 2024-11-04 NOTE — PROGRESS NOTES
Transportation Needs: Not on file   Physical Activity: Not on file   Stress: Not on file   Social Connections: Not on file   Intimate Partner Violence: Not on file   Housing Stability: Not on file     No family history on file.    REVIEW OF SYSTEMS:     General/Constitution:  (-)weight loss, (-)fever, (-)chills, (-)weakness.   Skin: (-) rash,(-) psoriasis,(-) eczema, (-)skin cancer.   Musculoskeletal: (-) fractures,  (-) dislocations,(-) collagen vascular disease, (-) fibromyalgia, (-) multiple sclerosis, (-) muscular dystrophy, (-) RSD,(-) joint pain (-)swelling, (-) joint pain,swelling.  Neurologic: (-) epilepsy, (-)seizures,(-) brain tumor,(-) TIA, (-)stroke, (-)headaches, (-)Parkinson disease,(-) memory loss, (-) LOC.  Cardiovascular: (-) Chest pain, (-) swelling in legs/feet, (-) SOB, (-) cramping in legs/feet with walking.  Respiratory: (-) SOB, (-) Coughing, (-) night sweats.  GI: (-) nausea, (-) vomiting, (-) diarrhea, (-) blood in stool, (-) gastric ulcer.  Psychiatric: (-) Depression, (-) Anxiety, (-) bipolar disease, (-) Alzheimer's Disease  Allergic/Immunologic: (-) allergies latex, (-) allergies metal, (-) skin sensitivity.  Hematlogic: (-) anemia, (-) blood transfusion, (-) DVT/PE, (-) Clotting disorders       Subjective:      Constitution:    Temp 98 °F (36.7 °C)   Ht 1.753 m (5' 9.02\")   Wt 93 kg (205 lb)   BMI 30.26 kg/m²     Psycihatric:    The patient is alert and oriented x 3, appears to be stated age and in no distress.      Respiratory:    Respiratory effort is not labored.  Patient is not gasping.  Palpation of the chest reveals no tactile fremitus.    Skin:    Upon inspection: the skin appears warm, dry and intact.  There is not a previous scar over the affected area.There is not any cellulitis, lymphedema or cutaneous lesions noted in the lower extremities.   Upon palpation there is no induration noted.          Neurologic:      Motor exam of the upper extremities show: The reflexes in

## 2024-11-18 ENCOUNTER — OFFICE VISIT (OUTPATIENT)
Dept: ORTHOPEDIC SURGERY | Age: 74
End: 2024-11-18

## 2024-11-18 VITALS — TEMPERATURE: 98.6 F | HEIGHT: 69 IN | BODY MASS INDEX: 30.36 KG/M2 | WEIGHT: 205 LBS

## 2024-11-18 DIAGNOSIS — S42.294A OTHER CLOSED NONDISPLACED FRACTURE OF PROXIMAL END OF RIGHT HUMERUS, INITIAL ENCOUNTER: Primary | ICD-10-CM

## 2024-11-18 NOTE — PROGRESS NOTES
Chief Complaint   Patient presents with    Follow-up     Workers Comp follow up Right shoulder. Patient states right shoulder is still painful. ROM is limited. Pain described as aching and sharp. Pain level today 7/10.       Jossue Simmons returns today for follow up of his right shoulder workers comp injury.  he reports that the pain in the shoulder is unchanged.  he has not been going to physical therapy.  He still has trouble with range of motion. Pain level is 7/10.    Past Medical History:   Diagnosis Date    Asthma     Factor 5 Leiden mutation, heterozygous (HCC)     Hypercholesteremia      No past surgical history on file.    Current Outpatient Medications:     oxyCODONE-acetaminophen (PERCOCET) 5-325 MG per tablet, , Disp: , Rfl:     apixaban (ELIQUIS) 5 MG TABS tablet, Eliquis 5 mg tablet  Take 1 tablet twice a day by oral route., Disp: , Rfl:     rosuvastatin (CRESTOR) 5 MG tablet, rosuvastatin 5 mg tablet  Take 1 tablet every day by oral route., Disp: , Rfl:     tamsulosin (FLOMAX) 0.4 MG capsule, tamsulosin 0.4 mg capsule  Take 2 capsules every day by oral route., Disp: , Rfl:   Allergies   Allergen Reactions    Penicillins      Social History     Socioeconomic History    Marital status:      Spouse name: Not on file    Number of children: Not on file    Years of education: Not on file    Highest education level: Not on file   Occupational History    Not on file   Tobacco Use    Smoking status: Never    Smokeless tobacco: Never   Substance and Sexual Activity    Alcohol use: Not on file    Drug use: Not on file    Sexual activity: Not on file   Other Topics Concern    Not on file   Social History Narrative    Not on file     Social Determinants of Health     Financial Resource Strain: Not on file   Food Insecurity: Not on file   Transportation Needs: Not on file   Physical Activity: Not on file   Stress: Not on file   Social Connections: Not on file   Intimate Partner Violence: Not on file